# Patient Record
Sex: FEMALE | Race: WHITE | NOT HISPANIC OR LATINO | Employment: FULL TIME | URBAN - METROPOLITAN AREA
[De-identification: names, ages, dates, MRNs, and addresses within clinical notes are randomized per-mention and may not be internally consistent; named-entity substitution may affect disease eponyms.]

---

## 2023-07-12 DIAGNOSIS — F41.1 GENERALIZED ANXIETY DISORDER: Primary | ICD-10-CM

## 2023-07-12 RX ORDER — FLUOXETINE HYDROCHLORIDE 20 MG/1
1 CAPSULE ORAL DAILY
COMMUNITY
Start: 2022-02-17 | End: 2023-07-12 | Stop reason: SDUPTHER

## 2023-07-12 RX ORDER — FLUOXETINE HYDROCHLORIDE 20 MG/1
20 CAPSULE ORAL DAILY
Qty: 90 CAPSULE | Refills: 1 | Status: SHIPPED | OUTPATIENT
Start: 2023-07-12 | End: 2023-12-13 | Stop reason: WASHOUT

## 2023-07-24 ASSESSMENT — ENCOUNTER SYMPTOMS
FEVER: 0
HEADACHES: 1
SHORTNESS OF BREATH: 1
SWEATS: 0
CHILLS: 0
SORE THROAT: 0
RHINORRHEA: 1
WEIGHT LOSS: 0
MYALGIAS: 0
WHEEZING: 1
HEMOPTYSIS: 0
HEARTBURN: 1
COUGH: 1

## 2023-07-25 ENCOUNTER — LAB (OUTPATIENT)
Dept: LAB | Facility: LAB | Age: 48
End: 2023-07-25
Payer: COMMERCIAL

## 2023-07-25 ENCOUNTER — OFFICE VISIT (OUTPATIENT)
Dept: PRIMARY CARE | Facility: CLINIC | Age: 48
End: 2023-07-25
Payer: COMMERCIAL

## 2023-07-25 VITALS
TEMPERATURE: 98.1 F | WEIGHT: 204 LBS | HEART RATE: 80 BPM | SYSTOLIC BLOOD PRESSURE: 124 MMHG | DIASTOLIC BLOOD PRESSURE: 80 MMHG | OXYGEN SATURATION: 95 % | BODY MASS INDEX: 36.14 KG/M2 | RESPIRATION RATE: 18 BRPM

## 2023-07-25 DIAGNOSIS — E03.9 HYPOTHYROIDISM, UNSPECIFIED TYPE: Primary | ICD-10-CM

## 2023-07-25 DIAGNOSIS — J45.20 MILD INTERMITTENT REACTIVE AIRWAY DISEASE WITHOUT COMPLICATION (HHS-HCC): ICD-10-CM

## 2023-07-25 DIAGNOSIS — E03.9 HYPOTHYROIDISM, UNSPECIFIED TYPE: ICD-10-CM

## 2023-07-25 DIAGNOSIS — J40 BRONCHITIS: ICD-10-CM

## 2023-07-25 LAB
THYROTROPIN (MIU/L) IN SER/PLAS BY DETECTION LIMIT <= 0.05 MIU/L: 16.46 MIU/L (ref 0.44–3.98)
THYROXINE (T4) FREE (NG/DL) IN SER/PLAS: 0.38 NG/DL (ref 0.61–1.12)

## 2023-07-25 PROCEDURE — 84439 ASSAY OF FREE THYROXINE: CPT

## 2023-07-25 PROCEDURE — 84443 ASSAY THYROID STIM HORMONE: CPT

## 2023-07-25 PROCEDURE — 99214 OFFICE O/P EST MOD 30 MIN: CPT | Performed by: FAMILY MEDICINE

## 2023-07-25 PROCEDURE — 36415 COLL VENOUS BLD VENIPUNCTURE: CPT

## 2023-07-25 RX ORDER — FLUCONAZOLE 150 MG/1
150 TABLET ORAL ONCE
Qty: 1 TABLET | Refills: 0 | Status: SHIPPED | OUTPATIENT
Start: 2023-07-25 | End: 2023-07-25

## 2023-07-25 RX ORDER — MONTELUKAST SODIUM 10 MG/1
1 TABLET ORAL DAILY
COMMUNITY
Start: 2022-10-10 | End: 2023-07-26 | Stop reason: SDUPTHER

## 2023-07-25 RX ORDER — RIZATRIPTAN BENZOATE 10 MG/1
TABLET ORAL
COMMUNITY
End: 2024-02-02 | Stop reason: SDUPTHER

## 2023-07-25 RX ORDER — GABAPENTIN 300 MG/1
1 CAPSULE ORAL 2 TIMES DAILY
COMMUNITY
Start: 2021-03-02 | End: 2023-08-16 | Stop reason: SDUPTHER

## 2023-07-25 RX ORDER — THYROID 120 MG/1
TABLET ORAL
COMMUNITY
Start: 2019-07-22 | End: 2023-08-16 | Stop reason: SDUPTHER

## 2023-07-25 RX ORDER — OMEPRAZOLE 20 MG/1
CAPSULE, DELAYED RELEASE ORAL
COMMUNITY

## 2023-07-25 RX ORDER — DOXYCYCLINE 100 MG/1
100 CAPSULE ORAL 2 TIMES DAILY
Qty: 14 CAPSULE | Refills: 0 | Status: SHIPPED | OUTPATIENT
Start: 2023-07-25 | End: 2023-08-01

## 2023-07-25 RX ORDER — ALBUTEROL SULFATE 90 UG/1
AEROSOL, METERED RESPIRATORY (INHALATION)
COMMUNITY
Start: 2020-11-18

## 2023-07-25 RX ORDER — BUDESONIDE AND FORMOTEROL FUMARATE DIHYDRATE 160; 4.5 UG/1; UG/1
2 AEROSOL RESPIRATORY (INHALATION)
Qty: 6 G | Refills: 1 | Status: SHIPPED | OUTPATIENT
Start: 2023-07-25

## 2023-07-25 ASSESSMENT — ENCOUNTER SYMPTOMS
HEMOPTYSIS: 0
CHILLS: 0
HEADACHES: 1
SWEATS: 0
WHEEZING: 1
FEVER: 0
WEIGHT LOSS: 0
SORE THROAT: 0
DIFFICULTY BREATHING: 1
COUGH: 1
HEARTBURN: 1
MYALGIAS: 0
SHORTNESS OF BREATH: 1
RHINORRHEA: 1
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
## 2023-07-25 NOTE — RESULT ENCOUNTER NOTE
Patient's TSH is 16.46.  We need to increase her dose of medication.  I need to know what she is taking dose wise  and medication wise

## 2023-07-26 DIAGNOSIS — E03.9 HYPOTHYROIDISM, UNSPECIFIED TYPE: Primary | ICD-10-CM

## 2023-07-26 DIAGNOSIS — J45.20 MILD INTERMITTENT REACTIVE AIRWAY DISEASE WITHOUT COMPLICATION (HHS-HCC): Primary | ICD-10-CM

## 2023-07-26 RX ORDER — THYROID 15 MG/1
15 TABLET ORAL DAILY
Qty: 30 TABLET | Refills: 2 | Status: SHIPPED | OUTPATIENT
Start: 2023-07-26 | End: 2023-10-18 | Stop reason: SDUPTHER

## 2023-07-26 RX ORDER — MONTELUKAST SODIUM 10 MG/1
10 TABLET ORAL DAILY
Qty: 90 TABLET | Refills: 1 | Status: SHIPPED | OUTPATIENT
Start: 2023-07-26 | End: 2023-10-19 | Stop reason: SDUPTHER

## 2023-07-26 NOTE — RESULT ENCOUNTER NOTE
Usha I added 15mg to take with your 120 mg of [place] Thyroid.  I want to do this for 4 weeks.  Due to your numbers we will probably have to increase this another 15 mg after 4 weeks.  I want to recheck in 2 to 3 months your levels.  Call me in 4 weeks to let me know how you are feeling with the increased dose

## 2023-08-16 DIAGNOSIS — M54.30 CHRONIC SCIATICA, UNSPECIFIED LATERALITY: ICD-10-CM

## 2023-08-16 DIAGNOSIS — E03.9 HYPOTHYROIDISM, UNSPECIFIED TYPE: Primary | ICD-10-CM

## 2023-08-16 RX ORDER — GABAPENTIN 300 MG/1
300 CAPSULE ORAL 2 TIMES DAILY
Qty: 60 CAPSULE | Refills: 0 | Status: SHIPPED | OUTPATIENT
Start: 2023-08-16 | End: 2023-08-17 | Stop reason: SDUPTHER

## 2023-08-16 RX ORDER — THYROID 120 MG/1
120 TABLET ORAL
Qty: 30 TABLET | Refills: 0 | Status: SHIPPED | OUTPATIENT
Start: 2023-08-16 | End: 2023-09-12 | Stop reason: SDUPTHER

## 2023-08-17 DIAGNOSIS — M54.30 CHRONIC SCIATICA, UNSPECIFIED LATERALITY: ICD-10-CM

## 2023-08-17 RX ORDER — GABAPENTIN 300 MG/1
300 CAPSULE ORAL 2 TIMES DAILY
Qty: 60 CAPSULE | Refills: 1 | Status: SHIPPED | OUTPATIENT
Start: 2023-08-17 | End: 2023-10-19 | Stop reason: SDUPTHER

## 2023-09-12 DIAGNOSIS — E03.9 HYPOTHYROIDISM, UNSPECIFIED TYPE: ICD-10-CM

## 2023-09-13 RX ORDER — THYROID 120 MG/1
120 TABLET ORAL
Qty: 30 TABLET | Refills: 0 | Status: SHIPPED | OUTPATIENT
Start: 2023-09-13 | End: 2023-09-26 | Stop reason: SDUPTHER

## 2023-09-26 DIAGNOSIS — E03.9 HYPOTHYROIDISM, UNSPECIFIED TYPE: ICD-10-CM

## 2023-09-26 RX ORDER — THYROID 120 MG/1
120 TABLET ORAL
Qty: 30 TABLET | Refills: 0 | Status: SHIPPED | OUTPATIENT
Start: 2023-09-26 | End: 2023-10-19 | Stop reason: SDUPTHER

## 2023-10-18 DIAGNOSIS — E03.9 HYPOTHYROIDISM, UNSPECIFIED TYPE: ICD-10-CM

## 2023-10-18 RX ORDER — THYROID 15 MG/1
15 TABLET ORAL DAILY
Qty: 30 TABLET | Refills: 2 | Status: SHIPPED | OUTPATIENT
Start: 2023-10-18

## 2023-10-19 DIAGNOSIS — M54.30 CHRONIC SCIATICA, UNSPECIFIED LATERALITY: ICD-10-CM

## 2023-10-19 DIAGNOSIS — L70.0 ACNE VULGARIS: Primary | ICD-10-CM

## 2023-10-19 DIAGNOSIS — J45.20 MILD INTERMITTENT REACTIVE AIRWAY DISEASE WITHOUT COMPLICATION (HHS-HCC): ICD-10-CM

## 2023-10-19 DIAGNOSIS — E03.9 HYPOTHYROIDISM, UNSPECIFIED TYPE: ICD-10-CM

## 2023-10-19 RX ORDER — GABAPENTIN 300 MG/1
300 CAPSULE ORAL 2 TIMES DAILY
Qty: 60 CAPSULE | Refills: 1 | Status: SHIPPED | OUTPATIENT
Start: 2023-10-19 | End: 2024-05-10 | Stop reason: SDUPTHER

## 2023-10-19 RX ORDER — CLINDAMYCIN PHOSPHATE 11.9 MG/ML
1 SOLUTION TOPICAL 2 TIMES DAILY
COMMUNITY
End: 2023-10-19 | Stop reason: SDUPTHER

## 2023-10-19 RX ORDER — CLINDAMYCIN PHOSPHATE 11.9 MG/ML
1 SOLUTION TOPICAL 2 TIMES DAILY
Qty: 15 ML | Refills: 1 | Status: SHIPPED | OUTPATIENT
Start: 2023-10-19 | End: 2024-02-02 | Stop reason: SDUPTHER

## 2023-10-19 RX ORDER — THYROID 120 MG/1
120 TABLET ORAL
Qty: 30 TABLET | Refills: 0 | Status: SHIPPED | OUTPATIENT
Start: 2023-10-19 | End: 2024-01-08 | Stop reason: SDUPTHER

## 2023-10-19 RX ORDER — MONTELUKAST SODIUM 10 MG/1
10 TABLET ORAL DAILY
Qty: 90 TABLET | Refills: 1 | Status: SHIPPED | OUTPATIENT
Start: 2023-10-19

## 2023-12-13 ENCOUNTER — OFFICE VISIT (OUTPATIENT)
Dept: OBSTETRICS AND GYNECOLOGY | Facility: CLINIC | Age: 48
End: 2023-12-13
Payer: COMMERCIAL

## 2023-12-13 ENCOUNTER — LAB (OUTPATIENT)
Dept: LAB | Facility: LAB | Age: 48
End: 2023-12-13
Payer: COMMERCIAL

## 2023-12-13 VITALS — DIASTOLIC BLOOD PRESSURE: 74 MMHG | BODY MASS INDEX: 35.78 KG/M2 | WEIGHT: 202 LBS | SYSTOLIC BLOOD PRESSURE: 120 MMHG

## 2023-12-13 DIAGNOSIS — E03.9 ACQUIRED HYPOTHYROIDISM: Primary | ICD-10-CM

## 2023-12-13 DIAGNOSIS — Z12.31 SCREENING MAMMOGRAM FOR BREAST CANCER: ICD-10-CM

## 2023-12-13 DIAGNOSIS — E03.9 ACQUIRED HYPOTHYROIDISM: ICD-10-CM

## 2023-12-13 DIAGNOSIS — R68.82 LOW LIBIDO: ICD-10-CM

## 2023-12-13 LAB
25(OH)D3 SERPL-MCNC: 23 NG/ML (ref 30–100)
T4 FREE SERPL-MCNC: 0.77 NG/DL (ref 0.61–1.12)
TSH SERPL-ACNC: 5.81 MIU/L (ref 0.44–3.98)

## 2023-12-13 PROCEDURE — 36415 COLL VENOUS BLD VENIPUNCTURE: CPT

## 2023-12-13 PROCEDURE — 82306 VITAMIN D 25 HYDROXY: CPT

## 2023-12-13 PROCEDURE — 99442 PR PHYS/QHP TELEPHONE EVALUATION 11-20 MIN: CPT | Performed by: OBSTETRICS & GYNECOLOGY

## 2023-12-13 PROCEDURE — 84443 ASSAY THYROID STIM HORMONE: CPT

## 2023-12-13 PROCEDURE — 84439 ASSAY OF FREE THYROXINE: CPT

## 2023-12-13 RX ORDER — BUPROPION HYDROCHLORIDE 75 MG/1
75 TABLET ORAL 2 TIMES DAILY
Qty: 60 TABLET | Refills: 11 | Status: SHIPPED | OUTPATIENT
Start: 2023-12-13 | End: 2024-12-12

## 2023-12-13 ASSESSMENT — PAIN SCALES - GENERAL: PAINLEVEL: 0-NO PAIN

## 2023-12-13 NOTE — PROGRESS NOTES
GYN PROGRESS NOTE          CC:   No chief complaint on file.      HPI:  Patient answers are not available for this visit.  HPI    Usha is here today for annual exam.    Complaints: she complains of fatigue (PCP adjusting thyroid meds), yeast infections 3 in a month- starting external and progresses to internal itching without discharge.  She is using vagisil cream.  Low libido. Crying easily.  LMP: 11/12/21 LH/BS  LAST PAP: 5/10/21 NEG/NEG  LAST MAMMOGRAM: 8/16/21  ABN MAMMOGRAM HX:  SEXUAL ACTIVITY: monogamous male partner  STI SCREENING: declines   COLONOSCOPY: 7/12/21   LAST VISIT: 12/12/22 annual  Her visit was chaperoned by: Annemarie MAYEN       Last edited by Ольга Granda RN on 12/13/2023 11:29 AM.        Discussed breast screening guidelines recommended MRI for dense breasts or high risk groups.  Biennial screening after 55 for average risk women  Discussed cervical cancer screening guidelines.  No urinary issues.  No urgency frequency or incontinence  No bowel issues.  Soft daily bowel movement.  No issues with pain discharge bleeding  Recommend bupropion to stabilize her mood and its positive effects on libido.    ROS:  GEN - no fevers or chills  RESP - no SOB or cough  GYN - see HPI      HISTORY:  No past medical history on file.  Past Surgical History:   Procedure Laterality Date    OTHER SURGICAL HISTORY  04/06/2021    Tonsillectomy    OTHER SURGICAL HISTORY  08/06/2021    Colonoscopy     Social History     Socioeconomic History    Marital status:      Spouse name: Not on file    Number of children: Not on file    Years of education: Not on file    Highest education level: Not on file   Occupational History    Not on file   Tobacco Use    Smoking status: Every Day     Types: Cigarettes    Smokeless tobacco: Never   Vaping Use    Vaping Use: Never used   Substance and Sexual Activity    Alcohol use: Yes    Drug use: Never    Sexual activity: Not on file   Other Topics Concern    Not on file    Social History Narrative    Not on file     Social Determinants of Health     Financial Resource Strain: Not on file   Food Insecurity: Not on file   Transportation Needs: Not on file   Physical Activity: Not on file   Stress: Not on file   Social Connections: Not on file   Intimate Partner Violence: Not on file   Housing Stability: Not on file     Cancer-related family history is not on file.       PHYSICAL EXAM:  /74 (BP Location: Left arm, Patient Position: Sitting, BP Cuff Size: Adult)   Wt 91.6 kg (202 lb)   BMI 35.78 kg/m²   GEN:  A&O, NAD  HEENT:  head HC/AT, no visible goiter  PSYCH:  normal affect, non-anxious      IMPRESSION/PLAN:  47-year-old hypothyroidism, low libido, and routine care.    - BI mammo bilateral screening tomosynthesis; Future  - TSH with reflex to Free T4 if abnormal; Future  - Vitamin D 25-Hydroxy,Total (for eval of Vitamin D levels); Future  - buPROPion (Wellbutrin) 75 mg tablet; Take 1 tablet (75 mg) by mouth 2 times a day.  Dispense: 60 tablet; Refill: 11    Follow up in 2 months to telehealth visit    Warren HERNANDEZ am scribing for virtually, and in the presence of Dr. Krystian Camacho on  12/13/23 at 2:22 PM     Agree with above, MARY Massey personally performed the services described in the documentation which was scribed virtually confirm is both complete and accurate. ADELAIDA Camacho MD

## 2023-12-15 ENCOUNTER — TELEPHONE (OUTPATIENT)
Dept: PRIMARY CARE | Facility: CLINIC | Age: 48
End: 2023-12-15
Payer: COMMERCIAL

## 2023-12-20 ENCOUNTER — TELEPHONE (OUTPATIENT)
Dept: PRIMARY CARE | Facility: CLINIC | Age: 48
End: 2023-12-20
Payer: COMMERCIAL

## 2024-01-08 DIAGNOSIS — E03.9 HYPOTHYROIDISM, UNSPECIFIED TYPE: ICD-10-CM

## 2024-01-08 RX ORDER — THYROID 120 MG/1
120 TABLET ORAL
Qty: 30 TABLET | Refills: 2 | Status: SHIPPED | OUTPATIENT
Start: 2024-01-08 | End: 2024-02-02 | Stop reason: SDUPTHER

## 2024-01-11 ENCOUNTER — APPOINTMENT (OUTPATIENT)
Dept: RADIOLOGY | Facility: HOSPITAL | Age: 49
End: 2024-01-11
Payer: COMMERCIAL

## 2024-01-26 ENCOUNTER — APPOINTMENT (OUTPATIENT)
Dept: PRIMARY CARE | Facility: CLINIC | Age: 49
End: 2024-01-26
Payer: COMMERCIAL

## 2024-02-01 ENCOUNTER — APPOINTMENT (OUTPATIENT)
Dept: RADIOLOGY | Facility: HOSPITAL | Age: 49
End: 2024-02-01
Payer: COMMERCIAL

## 2024-02-02 ENCOUNTER — OFFICE VISIT (OUTPATIENT)
Dept: PRIMARY CARE | Facility: CLINIC | Age: 49
End: 2024-02-02
Payer: COMMERCIAL

## 2024-02-02 VITALS
OXYGEN SATURATION: 97 % | TEMPERATURE: 97.6 F | WEIGHT: 197 LBS | RESPIRATION RATE: 16 BRPM | HEART RATE: 86 BPM | DIASTOLIC BLOOD PRESSURE: 78 MMHG | BODY MASS INDEX: 34.9 KG/M2 | SYSTOLIC BLOOD PRESSURE: 116 MMHG

## 2024-02-02 DIAGNOSIS — E03.9 HYPOTHYROIDISM, UNSPECIFIED TYPE: ICD-10-CM

## 2024-02-02 DIAGNOSIS — L70.0 ACNE VULGARIS: ICD-10-CM

## 2024-02-02 DIAGNOSIS — B37.31 YEAST VAGINITIS: ICD-10-CM

## 2024-02-02 DIAGNOSIS — J45.20 MILD INTERMITTENT REACTIVE AIRWAY DISEASE WITHOUT COMPLICATION (HHS-HCC): ICD-10-CM

## 2024-02-02 DIAGNOSIS — G43.009 MIGRAINE WITHOUT AURA AND WITHOUT STATUS MIGRAINOSUS, NOT INTRACTABLE: Primary | ICD-10-CM

## 2024-02-02 PROCEDURE — 99214 OFFICE O/P EST MOD 30 MIN: CPT | Performed by: FAMILY MEDICINE

## 2024-02-02 RX ORDER — FLUCONAZOLE 150 MG/1
150 TABLET ORAL ONCE
Qty: 2 TABLET | Refills: 1 | Status: SHIPPED | OUTPATIENT
Start: 2024-02-02 | End: 2024-02-02

## 2024-02-02 RX ORDER — DOXYCYCLINE 100 MG/1
100 TABLET ORAL 2 TIMES DAILY
COMMUNITY
End: 2024-02-02 | Stop reason: SDUPTHER

## 2024-02-02 RX ORDER — DOXYCYCLINE 100 MG/1
100 TABLET ORAL 2 TIMES DAILY
Qty: 14 TABLET | Refills: 0 | Status: SHIPPED | OUTPATIENT
Start: 2024-02-02

## 2024-02-02 RX ORDER — CLINDAMYCIN PHOSPHATE 11.9 MG/ML
1 SOLUTION TOPICAL 2 TIMES DAILY
Qty: 45 ML | Refills: 1 | Status: SHIPPED | OUTPATIENT
Start: 2024-02-02 | End: 2024-06-04 | Stop reason: SDUPTHER

## 2024-02-02 RX ORDER — FLUTICASONE PROPIONATE 50 MCG
2 SPRAY, SUSPENSION (ML) NASAL DAILY
Qty: 16 G | Refills: 3 | Status: SHIPPED | OUTPATIENT
Start: 2024-02-02

## 2024-02-02 RX ORDER — LORATADINE 10 MG/1
10 TABLET ORAL DAILY
COMMUNITY

## 2024-02-02 RX ORDER — THYROID 120 MG/1
120 TABLET ORAL
Qty: 30 TABLET | Refills: 2 | Status: SHIPPED | OUTPATIENT
Start: 2024-02-02

## 2024-02-02 RX ORDER — THYROID 30 MG/1
30 TABLET ORAL DAILY
Qty: 30 TABLET | Refills: 2 | Status: SHIPPED | OUTPATIENT
Start: 2024-02-02 | End: 2024-05-28 | Stop reason: SDUPTHER

## 2024-02-02 RX ORDER — RIZATRIPTAN BENZOATE 10 MG/1
10 TABLET ORAL ONCE AS NEEDED
Qty: 9 TABLET | Refills: 0 | Status: SHIPPED | OUTPATIENT
Start: 2024-02-02

## 2024-02-02 ASSESSMENT — ENCOUNTER SYMPTOMS
HEADACHES: 1
CONSTITUTIONAL NEGATIVE: 1
PSYCHIATRIC NEGATIVE: 1
WOUND: 1
RESPIRATORY NEGATIVE: 1
CARDIOVASCULAR NEGATIVE: 1
MUSCULOSKELETAL NEGATIVE: 1

## 2024-02-02 NOTE — PROGRESS NOTES
Subjective   Patient ID: Usha Blood is a 48 y.o. female who presents for Follow-up.    Patient has multiple issues going on.  Patient's migraines have been occurring.  She needs a refill of her triptan.  She is uses in the past with no complications no chest pain or shortness of breath no fever no chills no palpitations.  Has worked relieves her symptoms  Patient also feels like her thyroid is off she has been taking her medications.  Has some fatigue.  Patient due to antibiotics also has a vaginal yeast infection.  Diflucan usually resolves this  Patient has a history of acne/Hydro nidus.  She has 1 large red irritated area under her left axilla.  She states she has a healing wound on her abdomen and under her right breast.  Usually the antibiotics take care of her symptoms.  No fever no chills no discharge.  Usually they pop.  This 1 is very tender.  Patient has been put on the medication the area.  This is similar to her other prior episodes.         Review of Systems   Constitutional: Negative.    Respiratory: Negative.     Cardiovascular: Negative.    Musculoskeletal: Negative.    Skin:  Positive for wound.   Neurological:  Positive for headaches.   Psychiatric/Behavioral: Negative.         Objective   /78 (BP Location: Left arm, Patient Position: Sitting, BP Cuff Size: Adult)   Pulse 86   Temp 36.4 °C (97.6 °F)   Resp 16   Wt 89.4 kg (197 lb)   SpO2 97%   BMI 34.90 kg/m²     Physical Exam  Vitals reviewed.   Constitutional:       General: She is not in acute distress.     Appearance: Normal appearance. She is well-developed.   HENT:      Head: Normocephalic.   Cardiovascular:      Rate and Rhythm: Normal rate and regular rhythm.      Heart sounds: Normal heart sounds.   Pulmonary:      Effort: Pulmonary effort is normal.      Breath sounds: Normal breath sounds.   Skin:     Findings: Lesion (Patient has a 1 inch by three-quarter inch area of erythema under left axilla.  Hidradenitis  like/abscess like) present. No rash.   Neurological:      General: No focal deficit present.      Mental Status: She is alert.   Psychiatric:         Mood and Affect: Mood normal.         Behavior: Behavior normal.         Assessment/Plan   Problem List Items Addressed This Visit    None  Visit Diagnoses       Migraine without aura and without status migrainosus, not intractable    -  Primary    Relevant Medications    rizatriptan (Maxalt) 10 mg tablet    Acne vulgaris        Relevant Medications    clindamycin (Cleocin T) 1 % external solution    doxycycline (Adoxa) 100 mg tablet    Hypothyroidism, unspecified type        Relevant Medications    thyroid, pork, ([place] Thyroid) 30 mg tablet    thyroid, pork, ([place] Thyroid) 120 mg tablet    Yeast vaginitis        Relevant Medications    fluconazole (Diflucan) 150 mg tablet    Mild intermittent reactive airway disease without complication        Relevant Medications    fluticasone (Flonase) 50 mcg/actuation nasal spray          Will refill patient's medication.  Will increase her TSH.  Will treat her for yeast vaginitis.  Patient declined having the area under her left axilla I&D to or even to let me use a needle to try to open up the area.  She will prefer to see if the medication works.  She is aware of this starts getting bigger any fever chills any worsening symptoms any increased pain any concerning symptoms go to the ER  Follow-up in 2 weeks.

## 2024-02-14 ENCOUNTER — TELEMEDICINE (OUTPATIENT)
Dept: OBSTETRICS AND GYNECOLOGY | Facility: CLINIC | Age: 49
End: 2024-02-14
Payer: COMMERCIAL

## 2024-02-14 DIAGNOSIS — R68.82 LOW LIBIDO: Primary | ICD-10-CM

## 2024-02-14 DIAGNOSIS — B37.2 YEAST DERMATITIS: ICD-10-CM

## 2024-02-14 PROCEDURE — 99442 PR PHYS/QHP TELEPHONE EVALUATION 11-20 MIN: CPT | Performed by: OBSTETRICS & GYNECOLOGY

## 2024-02-14 RX ORDER — FLUCONAZOLE 150 MG/1
150 TABLET ORAL ONCE
Qty: 1 TABLET | Refills: 11 | Status: SHIPPED | OUTPATIENT
Start: 2024-02-14 | End: 2024-02-14

## 2024-02-15 NOTE — PROGRESS NOTES
GYN PROGRESS NOTE    Virtual or Telephone Consent    A telephone visit (audio only) between the patient (at the originating site) and the provider (at the distant site) was utilized to provide this telehealth service.   Verbal consent was requested and obtained from Usha Blood on this date, 02/14/24 for a telehealth visit.   11 minutes      Chief complaint: Here for a follow up.    HPI:  Patient answers are not available for this visit.  - Her PCP increased her [place] Thyroid and is now doing her Thyroid management.  - She has noticed improvement in her mood since stating Bupropion.  - Noted that she experienced a cyst under their arm, which led to the cancellation of a scheduled mammogram. The cyst was treated with antibiotics, which subsequently caused a yeast infection.     Recommend she continue current dosage of Bupropion and reassess once her thyroid levels are stable.    HISTORY:  No past medical history on file.  Past Surgical History:   Procedure Laterality Date    OTHER SURGICAL HISTORY  04/06/2021    Tonsillectomy    OTHER SURGICAL HISTORY  08/06/2021    Colonoscopy     Social History     Socioeconomic History    Marital status:      Spouse name: Not on file    Number of children: Not on file    Years of education: Not on file    Highest education level: Not on file   Occupational History    Not on file   Tobacco Use    Smoking status: Every Day     Types: Cigarettes    Smokeless tobacco: Never   Vaping Use    Vaping Use: Never used   Substance and Sexual Activity    Alcohol use: Yes    Drug use: Never    Sexual activity: Not on file   Other Topics Concern    Not on file   Social History Narrative    Not on file     Social Determinants of Health     Financial Resource Strain: Not on file   Food Insecurity: Not on file   Transportation Needs: Not on file   Physical Activity: Not on file   Stress: Not on file   Social Connections: Not on file   Intimate Partner Violence: Not on file    Housing Stability: Not on file     Cancer-related family history is not on file.       IMPRESSION/PLAN:  48-year-old low libido and yeast dermatitis.    - fluconazole (Diflucan) 150 mg tablet; Take 1 tablet (150 mg) by mouth 1 time for 1 dose.  Dispense: 1 tablet; Refill: 11   - Continue Bupropion 75 mg twice daily    Follow up as needed    Warren HERNANDEZ am scribing for virtually, and in the presence of Dr. Krystian Camacho on  02/14/24 at 8:40 PM     Agree with above, MARY Massey personally performed the services described in the documentation which was scribed virtually confirm is both complete and accurate. ADELAIDA Camacho MD

## 2024-02-20 ENCOUNTER — APPOINTMENT (OUTPATIENT)
Dept: RADIOLOGY | Facility: HOSPITAL | Age: 49
End: 2024-02-20
Payer: COMMERCIAL

## 2024-05-10 DIAGNOSIS — M54.30 CHRONIC SCIATICA, UNSPECIFIED LATERALITY: ICD-10-CM

## 2024-05-10 RX ORDER — GABAPENTIN 300 MG/1
300 CAPSULE ORAL 2 TIMES DAILY
Qty: 60 CAPSULE | Refills: 1 | Status: SHIPPED | OUTPATIENT
Start: 2024-05-10 | End: 2024-05-13 | Stop reason: SDUPTHER

## 2024-05-13 DIAGNOSIS — M54.30 CHRONIC SCIATICA, UNSPECIFIED LATERALITY: ICD-10-CM

## 2024-05-16 DIAGNOSIS — J45.20 MILD INTERMITTENT REACTIVE AIRWAY DISEASE WITHOUT COMPLICATION (HHS-HCC): Primary | ICD-10-CM

## 2024-05-16 RX ORDER — GABAPENTIN 300 MG/1
300 CAPSULE ORAL 2 TIMES DAILY
Qty: 60 CAPSULE | Refills: 1 | Status: SHIPPED | OUTPATIENT
Start: 2024-05-16

## 2024-05-17 RX ORDER — ALBUTEROL SULFATE AND BUDESONIDE 90; 80 UG/1; UG/1
2 AEROSOL, METERED RESPIRATORY (INHALATION) EVERY 6 HOURS PRN
Qty: 10.7 G | Refills: 1 | Status: SHIPPED | OUTPATIENT
Start: 2024-05-17

## 2024-05-28 DIAGNOSIS — E03.9 HYPOTHYROIDISM, UNSPECIFIED TYPE: ICD-10-CM

## 2024-05-28 RX ORDER — THYROID 30 MG/1
30 TABLET ORAL DAILY
Qty: 30 TABLET | Refills: 2 | Status: SHIPPED | OUTPATIENT
Start: 2024-05-28

## 2024-06-04 DIAGNOSIS — L70.0 ACNE VULGARIS: ICD-10-CM

## 2024-06-04 RX ORDER — CLINDAMYCIN PHOSPHATE 11.9 MG/ML
1 SOLUTION TOPICAL 2 TIMES DAILY
Qty: 45 ML | Refills: 1 | Status: SHIPPED | OUTPATIENT
Start: 2024-06-04

## 2024-07-24 DIAGNOSIS — E03.9 HYPOTHYROIDISM, UNSPECIFIED TYPE: ICD-10-CM

## 2024-07-24 RX ORDER — THYROID 120 MG/1
120 TABLET ORAL
Qty: 30 TABLET | Refills: 2 | Status: SHIPPED | OUTPATIENT
Start: 2024-07-24

## 2024-08-05 DIAGNOSIS — M54.30 CHRONIC SCIATICA, UNSPECIFIED LATERALITY: ICD-10-CM

## 2024-08-05 RX ORDER — GABAPENTIN 300 MG/1
300 CAPSULE ORAL 2 TIMES DAILY
Qty: 60 CAPSULE | Refills: 1 | Status: SHIPPED | OUTPATIENT
Start: 2024-08-05

## 2024-08-23 DIAGNOSIS — E03.9 HYPOTHYROIDISM, UNSPECIFIED TYPE: ICD-10-CM

## 2024-08-23 DIAGNOSIS — J45.20 MILD INTERMITTENT REACTIVE AIRWAY DISEASE WITHOUT COMPLICATION (HHS-HCC): ICD-10-CM

## 2024-08-23 RX ORDER — MONTELUKAST SODIUM 10 MG/1
10 TABLET ORAL DAILY
Qty: 90 TABLET | Refills: 1 | Status: SHIPPED | OUTPATIENT
Start: 2024-08-23

## 2024-08-23 RX ORDER — THYROID 30 MG/1
30 TABLET ORAL DAILY
Qty: 30 TABLET | Refills: 2 | Status: SHIPPED | OUTPATIENT
Start: 2024-08-23

## 2024-08-29 DIAGNOSIS — L70.0 ACNE VULGARIS: ICD-10-CM

## 2024-08-29 RX ORDER — CLINDAMYCIN PHOSPHATE 11.9 MG/ML
1 SOLUTION TOPICAL 2 TIMES DAILY
Qty: 45 ML | Refills: 1 | Status: SHIPPED | OUTPATIENT
Start: 2024-08-29

## 2024-10-01 ENCOUNTER — OFFICE VISIT (OUTPATIENT)
Dept: PRIMARY CARE | Facility: CLINIC | Age: 49
End: 2024-10-01
Payer: COMMERCIAL

## 2024-10-01 VITALS
OXYGEN SATURATION: 97 % | RESPIRATION RATE: 18 BRPM | TEMPERATURE: 97.6 F | SYSTOLIC BLOOD PRESSURE: 110 MMHG | HEART RATE: 88 BPM | WEIGHT: 189 LBS | DIASTOLIC BLOOD PRESSURE: 80 MMHG | BODY MASS INDEX: 33.48 KG/M2

## 2024-10-01 DIAGNOSIS — J02.9 SORE THROAT: ICD-10-CM

## 2024-10-01 DIAGNOSIS — R05.9 COUGH, UNSPECIFIED TYPE: ICD-10-CM

## 2024-10-01 LAB
POC BINAX EXPIRATION: NORMAL
POC BINAX NOW COVID SERIAL NUMBER: NORMAL
POC RAPID INFLUENZA A: NEGATIVE
POC RAPID INFLUENZA B: NEGATIVE
POC RAPID STREP: NEGATIVE
POC SARS-COV-2 AG BINAX: NORMAL

## 2024-10-01 PROCEDURE — 87651 STREP A DNA AMP PROBE: CPT

## 2024-10-01 PROCEDURE — 87804 INFLUENZA ASSAY W/OPTIC: CPT | Performed by: NURSE PRACTITIONER

## 2024-10-01 PROCEDURE — 87880 STREP A ASSAY W/OPTIC: CPT | Performed by: NURSE PRACTITIONER

## 2024-10-01 PROCEDURE — 99213 OFFICE O/P EST LOW 20 MIN: CPT | Performed by: NURSE PRACTITIONER

## 2024-10-01 PROCEDURE — 87811 SARS-COV-2 COVID19 W/OPTIC: CPT | Performed by: NURSE PRACTITIONER

## 2024-10-01 PROCEDURE — 87636 SARSCOV2 & INF A&B AMP PRB: CPT

## 2024-10-01 ASSESSMENT — ENCOUNTER SYMPTOMS
ABDOMINAL PAIN: 0
CHEST TIGHTNESS: 0
NAUSEA: 0
SINUS PRESSURE: 0
MYALGIAS: 1
SHORTNESS OF BREATH: 0
SORE THROAT: 1
DIAPHORESIS: 1
HEADACHES: 1
FEVER: 0
CHILLS: 1
COUGH: 1
FATIGUE: 1
RHINORRHEA: 0
WHEEZING: 0
SINUS PAIN: 0
VOMITING: 0
DIARRHEA: 1
APPETITE CHANGE: 0

## 2024-10-01 NOTE — PROGRESS NOTES
Subjective   Patient ID: Usha Blood is a 48 y.o. female who presents for Flu Symptoms.    Symptoms started on Sunday with chills. Yesterday, she developed a headache, burning in her throat, congestion, body aches. Pt had a fever (did not check); she was very hot. Pt tested for COVID yesterday and it was negative. Pt is not vaccinated against COVID. Pt works in [employer]. Tried allergy medication and tylenol.     Review of Systems   Constitutional:  Positive for chills, diaphoresis and fatigue. Negative for appetite change and fever.   HENT:  Positive for congestion, postnasal drip and sore throat. Negative for rhinorrhea, sinus pressure and sinus pain.    Respiratory:  Positive for cough. Negative for chest tightness, shortness of breath and wheezing.    Gastrointestinal:  Positive for diarrhea. Negative for abdominal pain, nausea and vomiting.   Musculoskeletal:  Positive for myalgias.   Skin:  Negative for rash.   Neurological:  Positive for headaches.     Objective   /80   Pulse 88   Temp 36.4 °C (97.6 °F) (Temporal)   Resp 18   Wt 85.7 kg (189 lb)   SpO2 97%   BMI 33.48 kg/m²     Physical Exam  Vitals reviewed.   Constitutional:       General: She is not in acute distress.     Appearance: Normal appearance. She is ill-appearing. She is not toxic-appearing.   HENT:      Head: Atraumatic.      Right Ear: Tympanic membrane, ear canal and external ear normal.      Left Ear: Tympanic membrane, ear canal and external ear normal.      Nose: Congestion present. No rhinorrhea.      Mouth/Throat:      Pharynx: Posterior oropharyngeal erythema present. No oropharyngeal exudate.      Comments: Tonsils surgically absent, uvula midline  Eyes:      Conjunctiva/sclera: Conjunctivae normal.   Cardiovascular:      Rate and Rhythm: Normal rate and regular rhythm.      Heart sounds: Normal heart sounds. No murmur heard.  Pulmonary:      Effort: Pulmonary effort is normal.      Breath sounds: Normal  breath sounds. No wheezing or rhonchi.      Comments: Dry cough noted during exam   Musculoskeletal:         General: Normal range of motion.   Skin:     General: Skin is warm and dry.   Neurological:      General: No focal deficit present.      Mental Status: She is alert.   Psychiatric:         Mood and Affect: Mood normal.     Assessment/Plan   Problem List Items Addressed This Visit    None  Visit Diagnoses         Codes    Sore throat     J02.9    Relevant Orders    POCT rapid strep A manually resulted (Completed)    Group A Streptococcus, PCR    Cough, unspecified type     R05.9    Relevant Orders    POCT Influenza A/B manually resulted (Completed)    POCT BinaxNOW Covid-19 Ag Card manually resulted        Rapid COVID, flu and strep testing are all negative. Will get PCR COVID, flu and strep testing as well. Pt advised to use inhaler every four to six hours as needed. Advised patient on use of humidifier and hot steam treatments. Discussed that patient is to drink plenty of fluids and stay well hydrated. Can take tylenol as needed for any fevers or discomfort. Patient can use flonase as well. Discussed that patient is to go to the ER for any chest pain, difficulty breathing, shortness of breath or new/concerning symptoms; she agreed. Will call pt when results become available. Pt to follow up in 2-3 days if no better.

## 2024-10-02 ENCOUNTER — LAB (OUTPATIENT)
Dept: LAB | Facility: LAB | Age: 49
End: 2024-10-02
Payer: COMMERCIAL

## 2024-10-02 ENCOUNTER — TELEPHONE (OUTPATIENT)
Dept: PRIMARY CARE | Facility: CLINIC | Age: 49
End: 2024-10-02
Payer: COMMERCIAL

## 2024-10-02 ENCOUNTER — OFFICE VISIT (OUTPATIENT)
Dept: PRIMARY CARE | Facility: CLINIC | Age: 49
End: 2024-10-02
Payer: COMMERCIAL

## 2024-10-02 VITALS
TEMPERATURE: 98 F | HEIGHT: 64 IN | RESPIRATION RATE: 16 BRPM | OXYGEN SATURATION: 96 % | WEIGHT: 189 LBS | DIASTOLIC BLOOD PRESSURE: 76 MMHG | SYSTOLIC BLOOD PRESSURE: 122 MMHG | HEART RATE: 84 BPM | BODY MASS INDEX: 32.27 KG/M2

## 2024-10-02 DIAGNOSIS — E03.9 HYPOTHYROIDISM, UNSPECIFIED TYPE: ICD-10-CM

## 2024-10-02 DIAGNOSIS — J01.00 SUBACUTE MAXILLARY SINUSITIS: Primary | ICD-10-CM

## 2024-10-02 LAB
FLUAV RNA RESP QL NAA+PROBE: NOT DETECTED
FLUBV RNA RESP QL NAA+PROBE: NOT DETECTED
S PYO DNA THROAT QL NAA+PROBE: NOT DETECTED
SARS-COV-2 RNA RESP QL NAA+PROBE: NOT DETECTED

## 2024-10-02 PROCEDURE — 36415 COLL VENOUS BLD VENIPUNCTURE: CPT

## 2024-10-02 PROCEDURE — 3008F BODY MASS INDEX DOCD: CPT | Performed by: FAMILY MEDICINE

## 2024-10-02 PROCEDURE — 99213 OFFICE O/P EST LOW 20 MIN: CPT | Performed by: FAMILY MEDICINE

## 2024-10-02 PROCEDURE — 84443 ASSAY THYROID STIM HORMONE: CPT

## 2024-10-02 RX ORDER — AZITHROMYCIN 250 MG/1
TABLET, FILM COATED ORAL
Qty: 6 TABLET | Refills: 0 | Status: SHIPPED | OUTPATIENT
Start: 2024-10-02 | End: 2024-10-07

## 2024-10-02 RX ORDER — IPRATROPIUM BROMIDE 21 UG/1
2 SPRAY, METERED NASAL EVERY 12 HOURS
Qty: 30 ML | Refills: 12 | Status: SHIPPED | OUTPATIENT
Start: 2024-10-02 | End: 2025-10-02

## 2024-10-02 ASSESSMENT — ENCOUNTER SYMPTOMS
HEADACHES: 1
CHEST TIGHTNESS: 0
RHINORRHEA: 1
CONSTITUTIONAL NEGATIVE: 1
COUGH: 1
SORE THROAT: 1
FEVER: 0

## 2024-10-02 NOTE — TELEPHONE ENCOUNTER
Spoke to patient and relayed results of negative covid, flu and strep testing. Patient is still feeling the same as yesterday. Will follow up with patient in the next 2-3 days to ensure she is improving.

## 2024-10-02 NOTE — PROGRESS NOTES
"Subjective   Patient ID: Usha Blood is a 48 y.o. female who presents for Sinusitis.    Patient states she has had 5 to 6 days of URI-like symptoms.  She actually had a pretty high fever last 3 days.  It did seem to break last night.  However she started blowing her nose and can yellow-green thick mucus.  Since then she has had a lot of drainage, coughing.  No chest pain or shortness of breath.  Just a lot of sinus pressure.  She has a history of sinus infections.  She still smoking, she did have her COVID test, strep and flu which were all negative.  She states this usually is her pattern for sinus infections    Cough  The current episode started in the past 7 days. The problem has been unchanged. The problem occurs every few minutes. The cough is Non-productive. Associated symptoms include chest pain, headaches, nasal congestion, postnasal drip, rhinorrhea and a sore throat. Pertinent negatives include no fever. The symptoms are aggravated by lying down.        Review of Systems   Constitutional: Negative.  Negative for fever.   HENT:  Positive for postnasal drip, rhinorrhea and sore throat.    Respiratory:  Positive for cough. Negative for chest tightness.    Cardiovascular:  Positive for chest pain.   Neurological:  Positive for headaches.       Objective   /76 (BP Location: Right arm, Patient Position: Sitting, BP Cuff Size: Adult)   Pulse 84   Temp 36.7 °C (98 °F)   Resp 16   Ht 1.632 m (5' 4.25\")   Wt 85.7 kg (189 lb)   SpO2 96%   BMI 32.19 kg/m²     Physical Exam  Constitutional:       Appearance: Normal appearance.   HENT:      Right Ear: Tympanic membrane normal.      Left Ear: Tympanic membrane normal.      Nose: Congestion present.   Cardiovascular:      Rate and Rhythm: Normal rate and regular rhythm.   Pulmonary:      Effort: Pulmonary effort is normal.      Breath sounds: Normal breath sounds.   Neurological:      Mental Status: She is alert.         Assessment/Plan   Problem " List Items Addressed This Visit    None  Visit Diagnoses       Subacute maxillary sinusitis    -  Primary    Hypothyroidism, unspecified type              Due to patient's symptoms, history of sinus infections and being a current smoker we will be aggressive and treat her.  Patient aware if any chest pain shortness of breath any nausea vomiting or fevers and concerns open go to ER  Side effects medication were explained  No history of C. difficile  Follow-up in 1 week if needed

## 2024-10-03 ENCOUNTER — TELEPHONE (OUTPATIENT)
Dept: PRIMARY CARE | Facility: CLINIC | Age: 49
End: 2024-10-03
Payer: COMMERCIAL

## 2024-10-03 LAB — TSH SERPL-ACNC: 0.71 MIU/L (ref 0.44–3.98)

## 2024-10-03 NOTE — TELEPHONE ENCOUNTER
----- Message from Cassy Guerrier sent at 10/3/2024 10:09 AM EDT -----  Can you please call patient and see how she is feeling? Let me know. Thanks!  ----- Message -----  From: JANA Montano  Sent: 10/2/2024  10:57 AM EDT  To: JANA Montano    Follow up

## 2024-10-03 NOTE — TELEPHONE ENCOUNTER
Spoke with patient to find out how she is doing and she said much better and accidentally saw her PCP yesterday when she went with her boyfriend to an appointment.  He ended up treating her for sinus infection.  She said thank you so much for following up with her and for seeing her a few days ago and that she appreciates all that you did for her.

## 2024-10-04 DIAGNOSIS — J01.00 SUBACUTE MAXILLARY SINUSITIS: Primary | ICD-10-CM

## 2024-10-04 RX ORDER — BENZONATATE 100 MG/1
100 CAPSULE ORAL 3 TIMES DAILY PRN
Qty: 30 CAPSULE | Refills: 0 | Status: SHIPPED | OUTPATIENT
Start: 2024-10-04 | End: 2024-10-14

## 2024-10-21 DIAGNOSIS — E03.9 HYPOTHYROIDISM, UNSPECIFIED TYPE: ICD-10-CM

## 2024-10-21 RX ORDER — THYROID 120 MG/1
120 TABLET ORAL
Qty: 30 TABLET | Refills: 2 | Status: SHIPPED | OUTPATIENT
Start: 2024-10-21

## 2024-10-22 DIAGNOSIS — E03.9 HYPOTHYROIDISM, UNSPECIFIED TYPE: ICD-10-CM

## 2024-10-22 RX ORDER — THYROID 30 MG/1
30 TABLET ORAL DAILY
Qty: 30 TABLET | Refills: 2 | Status: SHIPPED | OUTPATIENT
Start: 2024-10-22

## 2024-11-01 ENCOUNTER — APPOINTMENT (OUTPATIENT)
Dept: PRIMARY CARE | Facility: CLINIC | Age: 49
End: 2024-11-01
Payer: COMMERCIAL

## 2024-11-01 ENCOUNTER — LAB (OUTPATIENT)
Dept: LAB | Facility: LAB | Age: 49
End: 2024-11-01
Payer: COMMERCIAL

## 2024-11-01 VITALS
HEIGHT: 64 IN | TEMPERATURE: 97.3 F | WEIGHT: 195.4 LBS | BODY MASS INDEX: 33.36 KG/M2 | RESPIRATION RATE: 18 BRPM | HEART RATE: 84 BPM | DIASTOLIC BLOOD PRESSURE: 70 MMHG | SYSTOLIC BLOOD PRESSURE: 110 MMHG

## 2024-11-01 DIAGNOSIS — Z00.00 HEALTHCARE MAINTENANCE: ICD-10-CM

## 2024-11-01 DIAGNOSIS — Z00.00 HEALTHCARE MAINTENANCE: Primary | ICD-10-CM

## 2024-11-01 DIAGNOSIS — L70.0 ACNE VULGARIS: ICD-10-CM

## 2024-11-01 LAB
ALBUMIN SERPL BCP-MCNC: 4 G/DL (ref 3.4–5)
ALP SERPL-CCNC: 108 U/L (ref 33–110)
ALT SERPL W P-5'-P-CCNC: 15 U/L (ref 7–45)
ANION GAP SERPL CALC-SCNC: 9 MMOL/L (ref 10–20)
AST SERPL W P-5'-P-CCNC: 14 U/L (ref 9–39)
BILIRUB SERPL-MCNC: 0.3 MG/DL (ref 0–1.2)
BUN SERPL-MCNC: 6 MG/DL (ref 6–23)
CALCIUM SERPL-MCNC: 9.1 MG/DL (ref 8.6–10.3)
CHLORIDE SERPL-SCNC: 105 MMOL/L (ref 98–107)
CHOLEST SERPL-MCNC: 197 MG/DL (ref 0–199)
CHOLESTEROL/HDL RATIO: 4.7
CO2 SERPL-SCNC: 27 MMOL/L (ref 21–32)
CREAT SERPL-MCNC: 0.67 MG/DL (ref 0.5–1.05)
EGFRCR SERPLBLD CKD-EPI 2021: >90 ML/MIN/1.73M*2
ERYTHROCYTE [DISTWIDTH] IN BLOOD BY AUTOMATED COUNT: 13.1 % (ref 11.5–14.5)
GLUCOSE SERPL-MCNC: 108 MG/DL (ref 74–99)
HCT VFR BLD AUTO: 42.5 % (ref 36–46)
HDLC SERPL-MCNC: 41.5 MG/DL
HGB BLD-MCNC: 14.2 G/DL (ref 12–16)
LDLC SERPL CALC-MCNC: 138 MG/DL
MCH RBC QN AUTO: 32.5 PG (ref 26–34)
MCHC RBC AUTO-ENTMCNC: 33.4 G/DL (ref 32–36)
MCV RBC AUTO: 97 FL (ref 80–100)
NON HDL CHOLESTEROL: 156 MG/DL (ref 0–149)
NRBC BLD-RTO: 0 /100 WBCS (ref 0–0)
PLATELET # BLD AUTO: 307 X10*3/UL (ref 150–450)
POTASSIUM SERPL-SCNC: 4.2 MMOL/L (ref 3.5–5.3)
PROT SERPL-MCNC: 7 G/DL (ref 6.4–8.2)
RBC # BLD AUTO: 4.37 X10*6/UL (ref 4–5.2)
SODIUM SERPL-SCNC: 137 MMOL/L (ref 136–145)
TRIGL SERPL-MCNC: 86 MG/DL (ref 0–149)
VLDL: 17 MG/DL (ref 0–40)
WBC # BLD AUTO: 8.5 X10*3/UL (ref 4.4–11.3)

## 2024-11-01 PROCEDURE — 3008F BODY MASS INDEX DOCD: CPT | Performed by: FAMILY MEDICINE

## 2024-11-01 PROCEDURE — 80053 COMPREHEN METABOLIC PANEL: CPT

## 2024-11-01 PROCEDURE — 80061 LIPID PANEL: CPT

## 2024-11-01 PROCEDURE — 99396 PREV VISIT EST AGE 40-64: CPT | Performed by: FAMILY MEDICINE

## 2024-11-01 PROCEDURE — 85027 COMPLETE CBC AUTOMATED: CPT

## 2024-11-01 PROCEDURE — 36415 COLL VENOUS BLD VENIPUNCTURE: CPT

## 2024-11-01 RX ORDER — CLINDAMYCIN PHOSPHATE 11.9 MG/ML
1 SOLUTION TOPICAL 2 TIMES DAILY
Qty: 60 ML | Refills: 1 | Status: SHIPPED | OUTPATIENT
Start: 2024-11-01

## 2024-11-01 ASSESSMENT — ENCOUNTER SYMPTOMS
CARDIOVASCULAR NEGATIVE: 1
NEUROLOGICAL NEGATIVE: 1
ALLERGIC/IMMUNOLOGIC NEGATIVE: 1
ENDOCRINE NEGATIVE: 1
PSYCHIATRIC NEGATIVE: 1
LOSS OF SENSATION IN FEET: 0
DEPRESSION: 0
OCCASIONAL FEELINGS OF UNSTEADINESS: 0
EYES NEGATIVE: 1
GASTROINTESTINAL NEGATIVE: 1
CONSTITUTIONAL NEGATIVE: 1
RESPIRATORY NEGATIVE: 1
HEMATOLOGIC/LYMPHATIC NEGATIVE: 1
MUSCULOSKELETAL NEGATIVE: 1

## 2024-11-01 ASSESSMENT — PATIENT HEALTH QUESTIONNAIRE - PHQ9
1. LITTLE INTEREST OR PLEASURE IN DOING THINGS: NOT AT ALL
SUM OF ALL RESPONSES TO PHQ9 QUESTIONS 1 AND 2: 0
2. FEELING DOWN, DEPRESSED OR HOPELESS: NOT AT ALL

## 2024-12-20 DIAGNOSIS — M54.30 CHRONIC SCIATICA, UNSPECIFIED LATERALITY: ICD-10-CM

## 2024-12-20 RX ORDER — GABAPENTIN 300 MG/1
300 CAPSULE ORAL 2 TIMES DAILY
Qty: 60 CAPSULE | Refills: 1 | Status: SHIPPED | OUTPATIENT
Start: 2024-12-20

## 2025-01-06 ENCOUNTER — TELEPHONE (OUTPATIENT)
Dept: OBSTETRICS AND GYNECOLOGY | Facility: CLINIC | Age: 50
End: 2025-01-06
Payer: COMMERCIAL

## 2025-01-06 NOTE — TELEPHONE ENCOUNTER
Patient is coming in 01/30/25 for an annual visit. She will be out of her Bupropion before her annual. Can you refill it until her appointment. She uses [facility] [address].

## 2025-01-10 ENCOUNTER — TELEPHONE (OUTPATIENT)
Dept: PRIMARY CARE | Facility: CLINIC | Age: 50
End: 2025-01-10

## 2025-01-10 ENCOUNTER — OFFICE VISIT (OUTPATIENT)
Dept: PRIMARY CARE | Facility: CLINIC | Age: 50
End: 2025-01-10
Payer: COMMERCIAL

## 2025-01-10 VITALS
DIASTOLIC BLOOD PRESSURE: 66 MMHG | RESPIRATION RATE: 16 BRPM | SYSTOLIC BLOOD PRESSURE: 108 MMHG | TEMPERATURE: 98.1 F | WEIGHT: 201 LBS | BODY MASS INDEX: 34.32 KG/M2 | OXYGEN SATURATION: 96 % | HEART RATE: 76 BPM

## 2025-01-10 DIAGNOSIS — J06.9 UPPER RESPIRATORY TRACT INFECTION, UNSPECIFIED TYPE: Primary | ICD-10-CM

## 2025-01-10 LAB
POC RAPID INFLUENZA A: NEGATIVE
POC RAPID INFLUENZA B: NEGATIVE
POC RSV RAPID ANTIGEN: NEGATIVE
POC SARS-COV-2 AG BINAX: NORMAL

## 2025-01-10 PROCEDURE — 99213 OFFICE O/P EST LOW 20 MIN: CPT | Performed by: FAMILY MEDICINE

## 2025-01-10 PROCEDURE — 87807 RSV ASSAY W/OPTIC: CPT | Performed by: FAMILY MEDICINE

## 2025-01-10 PROCEDURE — 87804 INFLUENZA ASSAY W/OPTIC: CPT | Performed by: FAMILY MEDICINE

## 2025-01-10 PROCEDURE — 87811 SARS-COV-2 COVID19 W/OPTIC: CPT | Performed by: FAMILY MEDICINE

## 2025-01-10 RX ORDER — AMOXICILLIN AND CLAVULANATE POTASSIUM 875; 125 MG/1; MG/1
1 TABLET, FILM COATED ORAL 2 TIMES DAILY
Qty: 14 TABLET | Refills: 0 | Status: SHIPPED | OUTPATIENT
Start: 2025-01-10 | End: 2025-01-17

## 2025-01-10 ASSESSMENT — ENCOUNTER SYMPTOMS
COUGH: 1
SINUS PAIN: 1
HEADACHES: 1
NAUSEA: 1
CARDIOVASCULAR NEGATIVE: 1

## 2025-01-10 NOTE — PROGRESS NOTES
Subjective   Patient ID: Usha Blood is a 49 y.o. female who presents for URI.    Patient about a week has had right ear pain.  Some minor congestion.  Last night she started getting really congested.  She states she felt chills and feverish.  No rash, no vomiting no diarrhea no chest pain or shortness of breath.  She just started having her eyes burn her nose started running.  No dyspnea occasional cough however mostly just nasal congestion.  She states there have been people sick at work.  Patient's been trying to hydrate.  She took Tylenol for her fever.  No diarrhea no vomiting currently.    URI   This is a new problem. The current episode started yesterday. The problem has been gradually worsening. The maximum temperature recorded prior to her arrival was 102 - 102.9 F. The fever has been present for Less than 1 day. Associated symptoms include congestion, coughing, headaches, nausea, a plugged ear sensation and sinus pain. She has tried inhaler use and acetaminophen for the symptoms. The treatment provided mild relief.        Review of Systems   HENT:  Positive for congestion and sinus pain.    Respiratory:  Positive for cough.    Cardiovascular: Negative.    Gastrointestinal:  Positive for nausea.   Neurological:  Positive for headaches.       Objective   /66 (BP Location: Right arm, Patient Position: Sitting, BP Cuff Size: Large adult)   Pulse 76   Temp 36.7 °C (98.1 °F)   Resp 16   Wt 91.2 kg (201 lb)   SpO2 96%   BMI 34.32 kg/m²     Physical Exam  Vitals reviewed.   Constitutional:       General: She is not in acute distress.     Appearance: Normal appearance.   HENT:      Head: Normocephalic.      Right Ear: Ear canal and external ear normal.      Left Ear: Tympanic membrane, ear canal and external ear normal.      Ears:      Comments: Patient's right TM has slight erythema     Nose: Congestion present.      Comments: Clear rhinorrhea     Mouth/Throat:      Mouth: Mucous membranes  are moist.      Pharynx: No oropharyngeal exudate or posterior oropharyngeal erythema.   Eyes:      Conjunctiva/sclera: Conjunctivae normal.   Cardiovascular:      Rate and Rhythm: Normal rate and regular rhythm.      Heart sounds: Normal heart sounds.   Pulmonary:      Effort: Pulmonary effort is normal.      Breath sounds: Normal breath sounds. No stridor.   Lymphadenopathy:      Cervical: No cervical adenopathy.   Skin:     Findings: No rash.   Neurological:      Mental Status: She is alert.   Psychiatric:         Mood and Affect: Mood normal.         Behavior: Behavior normal.         Assessment/Plan   Problem List Items Addressed This Visit    None  Visit Diagnoses       Upper respiratory tract infection, unspecified type    -  Primary    Relevant Medications    amoxicillin-pot clavulanate (Augmentin) 875-125 mg tablet    Other Relevant Orders    POCT respiratory syncytial virus manually resulted (Completed)    POCT BinaxNOW Covid-19 Ag Card manually resulted (Completed)    POCT Influenza A/B manually resulted (Completed)    XR chest 2 views          Patient will start antibiotic.  May have signs of cystitis versus an ear infection.  However more concerning with her fever.  She is continue to try Tylenol stay hydrated.  Very possible it was early COVID early flu and it has not been attempted yet.  Patient aware if any chest pain shortness of breath any worsening symptoms any nausea vomiting or fever headache any concerning symptoms dyspnea worsening go to ER  Follow-up in 1 week  Side effects and medication plan

## 2025-01-21 DIAGNOSIS — E03.9 HYPOTHYROIDISM, UNSPECIFIED TYPE: ICD-10-CM

## 2025-01-21 RX ORDER — THYROID 120 MG/1
120 TABLET ORAL
Qty: 30 TABLET | Refills: 2 | Status: SHIPPED | OUTPATIENT
Start: 2025-01-21

## 2025-01-30 ENCOUNTER — APPOINTMENT (OUTPATIENT)
Dept: OBSTETRICS AND GYNECOLOGY | Facility: CLINIC | Age: 50
End: 2025-01-30
Payer: COMMERCIAL

## 2025-01-30 VITALS — DIASTOLIC BLOOD PRESSURE: 82 MMHG | WEIGHT: 200 LBS | SYSTOLIC BLOOD PRESSURE: 120 MMHG | BODY MASS INDEX: 34.15 KG/M2

## 2025-01-30 DIAGNOSIS — R68.82 LOW LIBIDO: ICD-10-CM

## 2025-01-30 DIAGNOSIS — N95.2 ATROPHIC VULVOVAGINITIS: Primary | ICD-10-CM

## 2025-01-30 DIAGNOSIS — Z11.3 SCREENING FOR STD (SEXUALLY TRANSMITTED DISEASE): ICD-10-CM

## 2025-01-30 PROCEDURE — 99396 PREV VISIT EST AGE 40-64: CPT | Performed by: OBSTETRICS & GYNECOLOGY

## 2025-01-30 RX ORDER — BUPROPION HYDROCHLORIDE 75 MG/1
75 TABLET ORAL 2 TIMES DAILY
Qty: 60 TABLET | Refills: 11 | Status: SHIPPED | OUTPATIENT
Start: 2025-01-30 | End: 2026-01-30

## 2025-01-30 RX ORDER — ESTRADIOL 0.1 MG/G
CREAM VAGINAL
Qty: 42.5 G | Refills: 3 | Status: SHIPPED | OUTPATIENT
Start: 2025-01-30

## 2025-01-30 ASSESSMENT — PATIENT HEALTH QUESTIONNAIRE - PHQ9
9. THOUGHTS THAT YOU WOULD BE BETTER OFF DEAD, OR OF HURTING YOURSELF: NOT AT ALL
1. LITTLE INTEREST OR PLEASURE IN DOING THINGS: MORE THAN HALF THE DAYS
5. POOR APPETITE OR OVEREATING: NOT AT ALL
6. FEELING BAD ABOUT YOURSELF - OR THAT YOU ARE A FAILURE OR HAVE LET YOURSELF OR YOUR FAMILY DOWN: NOT AT ALL
2. FEELING DOWN, DEPRESSED OR HOPELESS: NOT AT ALL
SUM OF ALL RESPONSES TO PHQ QUESTIONS 1-9: 7
1. LITTLE INTEREST OR PLEASURE IN DOING THINGS: NOT AT ALL
2. FEELING DOWN, DEPRESSED OR HOPELESS: MORE THAN HALF THE DAYS
SUM OF ALL RESPONSES TO PHQ9 QUESTIONS 1 AND 2: 0
4. FEELING TIRED OR HAVING LITTLE ENERGY: SEVERAL DAYS
8. MOVING OR SPEAKING SO SLOWLY THAT OTHER PEOPLE COULD HAVE NOTICED. OR THE OPPOSITE, BEING SO FIGETY OR RESTLESS THAT YOU HAVE BEEN MOVING AROUND A LOT MORE THAN USUAL: NOT AT ALL
SUM OF ALL RESPONSES TO PHQ9 QUESTIONS 1 AND 2: 4
10. IF YOU CHECKED OFF ANY PROBLEMS, HOW DIFFICULT HAVE THESE PROBLEMS MADE IT FOR YOU TO DO YOUR WORK, TAKE CARE OF THINGS AT HOME, OR GET ALONG WITH OTHER PEOPLE: SOMEWHAT DIFFICULT
3. TROUBLE FALLING OR STAYING ASLEEP OR SLEEPING TOO MUCH: SEVERAL DAYS
7. TROUBLE CONCENTRATING ON THINGS, SUCH AS READING THE NEWSPAPER OR WATCHING TELEVISION: SEVERAL DAYS

## 2025-01-31 LAB
C TRACH RRNA SPEC QL NAA+PROBE: NOT DETECTED
N GONORRHOEA RRNA SPEC QL NAA+PROBE: NOT DETECTED
QUEST GC CT AMPLIFIED (ALWAYS MESSAGE): NORMAL
T VAGINALIS RRNA SPEC QL NAA+PROBE: NOT DETECTED

## 2025-02-05 NOTE — PROGRESS NOTES
Well woman care visit      CC:  Annual GYN examination.      HPI:  Patient answers are not available for this visit.  HPI       Follow-up     Additional comments: Established patient    Chaperone: JORGE Vickers/ Medical student                 Comments    Usha is a 49 year old here for her annual exam  Hysterectomy  Mamm: 8/16/2021 het dense CAT 1  She is having pain from left ovary/ pcos. She has been taking tylenol to help with pain.   No bladder or bowel issues.   Recurrent yeast infections after intercourse since hysterectomy.            Last edited by Alee Chawla CMA on 1/30/2025 11:52 AM.          Breast cancer screening discussed     Discussed fertility status and plans    Discussed cervical cancer screening guidelines.    No urinary issues.  No urgency frequency or incontinence    No bowel issues.  Soft daily bowel movement.    No issues with pain discharge bleeding.        ROS:  GEN - no fevers or chills  RESP - no SOB or cough  GI - no blood in BMs  URO - no hematuria  GYN - no AUB or vaginal discharge  PSYCH - mood OK      Past Medical History:   Diagnosis Date    Allergic     Asthma     Disease of thyroid gland     Eczema     GERD (gastroesophageal reflux disease)     Headache      Past Surgical History:   Procedure Laterality Date    HYSTERECTOMY  11/2021    OTHER SURGICAL HISTORY  04/06/2021    Tonsillectomy    OTHER SURGICAL HISTORY  08/06/2021    Colonoscopy    TONSILLECTOMY  1990    WISDOM TOOTH EXTRACTION  1993     Social History     Socioeconomic History    Marital status:      Spouse name: Not on file    Number of children: Not on file    Years of education: Not on file    Highest education level: Not on file   Occupational History    Not on file   Tobacco Use    Smoking status: Every Day     Current packs/day: 0.50     Average packs/day: 0.5 packs/day for 15.0 years (7.5 ttl pk-yrs)     Types: Cigarettes    Smokeless tobacco: Never   Vaping Use    Vaping status: Never Used    Substance and Sexual Activity    Alcohol use: Yes    Drug use: Never    Sexual activity: Yes     Partners: Male     Birth control/protection: Female Sterilization   Other Topics Concern    Not on file   Social History Narrative    Not on file     Social Drivers of Health     Financial Resource Strain: Not on file   Food Insecurity: Not on file   Transportation Needs: Not on file   Physical Activity: Not on file   Stress: Not on file   Social Connections: Not on file   Intimate Partner Violence: Not on file   Housing Stability: Not on file     Cancer-related family history is not on file.       PHYSICAL EXAM:  /82 (BP Location: Left arm, Patient Position: Sitting, BP Cuff Size: Adult)   Wt 90.7 kg (200 lb)   BMI 34.15 kg/m²   General: Appears stated age, no acute distress   Head: NCAT  Skin: Not diaphoretic, no flushing,   Eye: PERRL, EOMI   Respiratory: No respiratory distress or shortness of breath   Musculoskeletal:  BLE and BUE movement intact   Neuro: normal speech, no gait abnormalities noted  Psych: normal affect    IMPRESSION/PLAN:  49 y.o. routine care, genitourinary symptoms of menopause    Topical vulvar estrogen nightly    Krystian Camacho MD

## 2025-02-18 DIAGNOSIS — L70.0 ACNE VULGARIS: ICD-10-CM

## 2025-02-18 RX ORDER — CLINDAMYCIN PHOSPHATE 11.9 MG/ML
1 SOLUTION TOPICAL 2 TIMES DAILY
Qty: 60 ML | Refills: 1 | Status: SHIPPED | OUTPATIENT
Start: 2025-02-18

## 2025-03-19 DIAGNOSIS — E03.9 HYPOTHYROIDISM, UNSPECIFIED TYPE: ICD-10-CM

## 2025-03-19 DIAGNOSIS — J45.20 MILD INTERMITTENT REACTIVE AIRWAY DISEASE WITHOUT COMPLICATION (HHS-HCC): ICD-10-CM

## 2025-03-19 RX ORDER — THYROID 30 MG/1
30 TABLET ORAL DAILY
Qty: 30 TABLET | Refills: 2 | Status: SHIPPED | OUTPATIENT
Start: 2025-03-19

## 2025-03-19 RX ORDER — MONTELUKAST SODIUM 10 MG/1
10 TABLET ORAL DAILY
Qty: 90 TABLET | Refills: 1 | Status: SHIPPED | OUTPATIENT
Start: 2025-03-19

## 2025-03-20 DIAGNOSIS — M54.30 CHRONIC SCIATICA, UNSPECIFIED LATERALITY: ICD-10-CM

## 2025-03-20 RX ORDER — GABAPENTIN 300 MG/1
300 CAPSULE ORAL 2 TIMES DAILY
Qty: 60 CAPSULE | Refills: 1 | Status: SHIPPED | OUTPATIENT
Start: 2025-03-20

## 2025-04-30 DIAGNOSIS — L70.0 ACNE VULGARIS: ICD-10-CM

## 2025-04-30 RX ORDER — CLINDAMYCIN PHOSPHATE 11.9 MG/ML
1 SOLUTION TOPICAL 2 TIMES DAILY
Qty: 60 ML | Refills: 1 | Status: SHIPPED | OUTPATIENT
Start: 2025-04-30

## 2025-05-16 DIAGNOSIS — E03.9 HYPOTHYROIDISM, UNSPECIFIED TYPE: ICD-10-CM

## 2025-05-16 RX ORDER — THYROID 120 MG/1
120 TABLET ORAL
Qty: 30 TABLET | Refills: 2 | Status: SHIPPED | OUTPATIENT
Start: 2025-05-16

## 2025-06-02 DIAGNOSIS — M54.30 CHRONIC SCIATICA, UNSPECIFIED LATERALITY: ICD-10-CM

## 2025-06-02 RX ORDER — GABAPENTIN 300 MG/1
300 CAPSULE ORAL 2 TIMES DAILY
Qty: 60 CAPSULE | Refills: 1 | Status: SHIPPED | OUTPATIENT
Start: 2025-06-02

## 2025-07-14 NOTE — PROGRESS NOTES
"Subjective   Usha Blood is a 49 y.o. female who presents for Sinusitis (Pt here today for possible sinus infection. Pt stated that allergies have been really bad and that she has HA's. Pt also stated that she echoing and rumbling.)  Her last ear infection/sinus infection symptoms were in Jan 2025, treated with augmentin after 1 day of symptoms.    Feeling rumbling, fluid behind ears, she describes it as the \"feeling of a helicopter landing on the building and it being behind her ears\".  No fever or chills, she has been having congestion and sinus pain and pressure for the last approximately 3 weeks.  Has been taking her allergy medicine but no nasal spray.    Carries both airsupra and albuterol with her  OC spray at the [employer] flares her asthma up badly  Wants to try the symbicort with new med coverage insurance    Also needs other refills. Mood has not been good, has been off wellbutrin since Nov last year, gyn was prescribing it. Crying out of the blue, doesn't feel like doing any activities that bring her jony normally. Work is very hard, lots of serious things at the [employer], takes on a lot of the stories she hears.  Felt like the Wellbutrin helped a little bit at the beginning but then did not feel like it did much.  She is only around 75 mg twice daily, said 20 years ago when she tried a higher dose for smoking cessation it made her \"crazy\".  She is willing to try it again though.  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    Ear Fullness   There is pain in both (Right greater than left) ears. This is a new problem. The current episode started 1 to 4 weeks ago. The problem occurs constantly. The problem has been gradually worsening. There has been no fever. The patient is experiencing no pain. Associated symptoms include headaches and rhinorrhea. She has tried nothing for " the symptoms. There is no history of hearing loss.     Objective     /75 (BP Location: Right arm, Patient Position: Sitting)   Pulse 81   Temp 36.7 °C (98 °F) (Temporal)   Resp 16   Wt 91.9 kg (202 lb 8 oz)   SpO2 96%   BMI 34.57 kg/m²   Physical Exam  Vitals reviewed.   Constitutional:       General: She is not in acute distress.  HENT:      Right Ear: Ear canal normal. No drainage. A middle ear effusion is present. Tympanic membrane is erythematous.      Left Ear: Ear canal normal. No drainage. A middle ear effusion is present.      Nose: Rhinorrhea present.      Mouth/Throat:      Mouth: Mucous membranes are moist.      Pharynx: No oropharyngeal exudate or posterior oropharyngeal erythema.   Eyes:      Extraocular Movements: Extraocular movements intact.      Conjunctiva/sclera: Conjunctivae normal.   Cardiovascular:      Rate and Rhythm: Normal rate and regular rhythm.      Heart sounds: No murmur heard.  Pulmonary:      Effort: No respiratory distress.      Breath sounds: Normal breath sounds.   Abdominal:      General: Abdomen is flat. Bowel sounds are normal.      Palpations: Abdomen is soft.      Tenderness: There is no abdominal tenderness.   Musculoskeletal:      Right lower leg: No edema.      Left lower leg: No edema.   Skin:     General: Skin is warm and dry.   Neurological:      General: No focal deficit present.      Mental Status: She is alert.   Psychiatric:         Attention and Perception: Attention and perception normal.         Mood and Affect: Mood is depressed. Affect is tearful.         Speech: Speech normal.         Behavior: Behavior normal.         Thought Content: Thought content normal.         Judgment: Judgment normal.       Assessment & Plan  Mild intermittent reactive airway disease without complication (HHS-HCC)  Symptoms happen with exposure to chemicals at work and environment.  Previously her symbicort helped but wasn't covered by insurance - she was using it PRN  instead of daily. I recommend that she take it daily and see how this helps her symptoms.   Refilled her Airsupra for rescue inhaler she feels this works better than the albuterol alone.  I recommend the patient take her Flonase for her current congestion and sinus symptoms.  I refilled this for her today.  Orders:    budesonide-formoterol (Symbicort) 160-4.5 mcg/actuation inhaler; Inhale 2 puffs 2 times a day. Rinse mouth with water after use to reduce aftertaste and incidence of candidiasis. Do not swallow.    albuterol-budesonide (Airsupra) 90-80 mcg/actuation inhaler; Inhale 2 puffs every 6 hours if needed (prn).    fluticasone (Flonase) 50 mcg/actuation nasal spray; Administer 2 sprays into each nostril once daily. Shake gently. Before first use, prime pump. After use, clean tip and replace cap.    Migraine without aura and without status migrainosus, not intractable  Patient requires refill for her Maxalt, this is controlling her migraines, she does get migraines on the left frontal side of her head especially when she has sinus symptoms.  Continue Maxalt 10 mg as needed as needed for migraine.  Orders:    rizatriptan (Maxalt) 10 mg tablet; Take 1 tablet (10 mg) by mouth 1 time if needed for migraine.    Gastroesophageal reflux disease, unspecified whether esophagitis present  Patient requesting refill, this is currently symptoms.  Continue omeprazole 20 mg in the morning.  Orders:    omeprazole (PriLOSEC) 20 mg DR capsule; Take 1 capsule (20 mg) by mouth once daily in the morning. Take before meals.    Acne vulgaris  Requesting refill on her clindamycin solution.  Orders:    clindamycin (Cleocin T) 1 % external solution; Apply 1 Application topically 2 times a day.    Hypothyroidism, unspecified type  She is currently taking Wine Ring Thyroid and has not her thyroid checked in 9 months given her worsening mood symptoms we will recheck her thyroid level today.  Orders:    Tsh With Reflex To Free T4 If Abnormal;  Future    Acute frontal sinusitis, recurrence not specified  Augmentin x 7 days for frontal sinus pressure and pain on exam, mild right effusion and erythema of the TM given that her symptoms been going on for greater than 2 weeks.  Recommend she start the Flonase daily now.  Orders:    amoxicillin-clavulanate (Augmentin) 875-125 mg tablet; Take 1 tablet (875 mg of amoxicillin) by mouth 2 times a day for 7 days.    Current moderate episode of major depressive disorder without prior episode (Multi)  Patient is describing multiple symptoms of major depressive disorder that are impacting her ability to enjoy life.  I would recommend she resume the Wellbutrin and she is on board with this today.  We will start at the 75 mg, first few days she can take it once a day and then uptitrate to twice a day.  I would like to see her back in 6 to 8 weeks and I would like to consider going up to 150 on the Wellbutrin at that time.  Could consider another SSRI or SNRI if she has not tried any of these before.  Orders:    Follow Up In [facility] - PCP - Established; Future    buPROPion (Wellbutrin) 75 mg tablet; Take 1 tablet (75 mg) by mouth 2 times a day.      Patient seen and discussed with attending physician, Dr. Atkins.    Amy Vargas,   Family Medicine - PGY-2  [facility]  [address] [place] [address] [phone]

## 2025-07-15 ENCOUNTER — OFFICE VISIT (OUTPATIENT)
Dept: PRIMARY CARE | Facility: CLINIC | Age: 50
End: 2025-07-15
Payer: COMMERCIAL

## 2025-07-15 VITALS
BODY MASS INDEX: 34.57 KG/M2 | WEIGHT: 202.5 LBS | OXYGEN SATURATION: 96 % | SYSTOLIC BLOOD PRESSURE: 133 MMHG | HEART RATE: 81 BPM | DIASTOLIC BLOOD PRESSURE: 75 MMHG | TEMPERATURE: 98 F | RESPIRATION RATE: 16 BRPM

## 2025-07-15 DIAGNOSIS — J01.10 ACUTE FRONTAL SINUSITIS, RECURRENCE NOT SPECIFIED: ICD-10-CM

## 2025-07-15 DIAGNOSIS — L70.0 ACNE VULGARIS: ICD-10-CM

## 2025-07-15 DIAGNOSIS — E03.9 HYPOTHYROIDISM, UNSPECIFIED TYPE: ICD-10-CM

## 2025-07-15 DIAGNOSIS — K21.9 GASTROESOPHAGEAL REFLUX DISEASE, UNSPECIFIED WHETHER ESOPHAGITIS PRESENT: Primary | ICD-10-CM

## 2025-07-15 DIAGNOSIS — F32.1 CURRENT MODERATE EPISODE OF MAJOR DEPRESSIVE DISORDER WITHOUT PRIOR EPISODE (MULTI): ICD-10-CM

## 2025-07-15 DIAGNOSIS — G43.009 MIGRAINE WITHOUT AURA AND WITHOUT STATUS MIGRAINOSUS, NOT INTRACTABLE: ICD-10-CM

## 2025-07-15 DIAGNOSIS — J45.20 MILD INTERMITTENT REACTIVE AIRWAY DISEASE WITHOUT COMPLICATION (HHS-HCC): ICD-10-CM

## 2025-07-15 PROCEDURE — 99214 OFFICE O/P EST MOD 30 MIN: CPT

## 2025-07-15 RX ORDER — OMEPRAZOLE 20 MG/1
20 CAPSULE, DELAYED RELEASE ORAL
Qty: 90 CAPSULE | Refills: 3 | Status: SHIPPED | OUTPATIENT
Start: 2025-07-15 | End: 2026-07-15

## 2025-07-15 RX ORDER — CLINDAMYCIN PHOSPHATE 11.9 MG/ML
1 SOLUTION TOPICAL 2 TIMES DAILY
Qty: 60 ML | Refills: 1 | Status: SHIPPED | OUTPATIENT
Start: 2025-07-15

## 2025-07-15 RX ORDER — AMOXICILLIN AND CLAVULANATE POTASSIUM 875; 125 MG/1; MG/1
875 TABLET, FILM COATED ORAL 2 TIMES DAILY
Qty: 14 TABLET | Refills: 0 | Status: SHIPPED | OUTPATIENT
Start: 2025-07-15 | End: 2025-07-22

## 2025-07-15 RX ORDER — BUPROPION HYDROCHLORIDE 75 MG/1
75 TABLET ORAL 2 TIMES DAILY
Qty: 180 TABLET | Refills: 1 | Status: SHIPPED | OUTPATIENT
Start: 2025-07-15 | End: 2026-01-11

## 2025-07-15 RX ORDER — RIZATRIPTAN BENZOATE 10 MG/1
10 TABLET ORAL ONCE AS NEEDED
Qty: 9 TABLET | Refills: 3 | Status: SHIPPED | OUTPATIENT
Start: 2025-07-15 | End: 2026-07-15

## 2025-07-15 RX ORDER — BUPROPION HYDROCHLORIDE 75 MG/1
75 TABLET ORAL 2 TIMES DAILY
Qty: 180 TABLET | Refills: 1 | Status: SHIPPED | OUTPATIENT
Start: 2025-07-15 | End: 2025-07-15

## 2025-07-15 RX ORDER — BUDESONIDE AND FORMOTEROL FUMARATE DIHYDRATE 160; 4.5 UG/1; UG/1
2 AEROSOL RESPIRATORY (INHALATION)
Qty: 6 G | Refills: 1 | Status: SHIPPED | OUTPATIENT
Start: 2025-07-15

## 2025-07-15 RX ORDER — FLUTICASONE PROPIONATE 50 MCG
2 SPRAY, SUSPENSION (ML) NASAL DAILY
Qty: 16 G | Refills: 3 | Status: SHIPPED | OUTPATIENT
Start: 2025-07-15

## 2025-07-15 RX ORDER — ALBUTEROL SULFATE AND BUDESONIDE 90; 80 UG/1; UG/1
2 AEROSOL, METERED RESPIRATORY (INHALATION) EVERY 6 HOURS PRN
Qty: 10.7 G | Refills: 1 | Status: SHIPPED | OUTPATIENT
Start: 2025-07-15

## 2025-07-15 ASSESSMENT — ENCOUNTER SYMPTOMS
HEADACHES: 1
RHINORRHEA: 1

## 2025-07-15 NOTE — PROGRESS NOTES
I saw and evaluated the patient. I personally obtained the key and critical portions of the history and physical exam or was physically present for key and critical portions performed by the resident. I reviewed the resident/fellow's documentation and discussed the patient with the resident/fellow. I agree with the resident/fellow's medical decision making as documented in the note.  She would like to try the medication.  She is aware of the side effects of it.  She denies any SI or HI thoughts if she starts feeling any SI or HI thoughts ER for thoughts or self or others or any concerning symptoms any chest pain shortness of breath any fever chills worsening symptoms go to ER.  Patient does appear to have sinusitis.  Patient to follow-up in 2 weeks  Agree with assessment and plan    Jonathon Atkins, DO

## 2025-07-16 LAB — TSH SERPL-ACNC: 2.49 MIU/L

## 2025-07-21 DIAGNOSIS — J45.20 MILD INTERMITTENT REACTIVE AIRWAY DISEASE WITHOUT COMPLICATION (HHS-HCC): Primary | ICD-10-CM

## 2025-07-21 RX ORDER — ALBUTEROL SULFATE 90 UG/1
2 INHALANT RESPIRATORY (INHALATION) EVERY 6 HOURS PRN
Qty: 18 G | Refills: 2 | Status: SHIPPED | OUTPATIENT
Start: 2025-07-21 | End: 2026-07-21

## 2025-07-21 NOTE — TELEPHONE ENCOUNTER
Patient called to request prescription for Albuterol inhaler as the pharmacy recommended that she does not take Symbicort and Air Supra together as they are both steroids. Patient would like to try the Symbicort and Albuterol together which she said has provided the best relief of her symptoms in the past.

## 2025-07-29 ENCOUNTER — APPOINTMENT (OUTPATIENT)
Dept: PRIMARY CARE | Facility: CLINIC | Age: 50
End: 2025-07-29
Payer: COMMERCIAL

## 2025-08-05 DIAGNOSIS — Z12.31 ENCOUNTER FOR SCREENING MAMMOGRAM FOR BREAST CANCER: ICD-10-CM

## 2026-01-28 ENCOUNTER — APPOINTMENT (OUTPATIENT)
Dept: OBSTETRICS AND GYNECOLOGY | Facility: CLINIC | Age: 51
End: 2026-01-28
Payer: COMMERCIAL